# Patient Record
Sex: MALE | Race: WHITE
[De-identification: names, ages, dates, MRNs, and addresses within clinical notes are randomized per-mention and may not be internally consistent; named-entity substitution may affect disease eponyms.]

---

## 2017-06-02 ENCOUNTER — HOSPITAL ENCOUNTER (INPATIENT)
Dept: HOSPITAL 12 - ER | Age: 67
LOS: 6 days | Discharge: NURSING, REHAB OR LONG-TERM CARE | DRG: 280 | End: 2017-06-08
Payer: MEDICARE

## 2017-06-02 VITALS — DIASTOLIC BLOOD PRESSURE: 84 MMHG | SYSTOLIC BLOOD PRESSURE: 131 MMHG

## 2017-06-02 VITALS — SYSTOLIC BLOOD PRESSURE: 123 MMHG | DIASTOLIC BLOOD PRESSURE: 82 MMHG

## 2017-06-02 VITALS — DIASTOLIC BLOOD PRESSURE: 72 MMHG | SYSTOLIC BLOOD PRESSURE: 113 MMHG

## 2017-06-02 VITALS — HEIGHT: 73.5 IN | BODY MASS INDEX: 21.4 KG/M2 | WEIGHT: 165 LBS

## 2017-06-02 VITALS — DIASTOLIC BLOOD PRESSURE: 71 MMHG | SYSTOLIC BLOOD PRESSURE: 123 MMHG

## 2017-06-02 VITALS — DIASTOLIC BLOOD PRESSURE: 82 MMHG | SYSTOLIC BLOOD PRESSURE: 126 MMHG

## 2017-06-02 DIAGNOSIS — I21.4: Primary | ICD-10-CM

## 2017-06-02 DIAGNOSIS — Z79.899: ICD-10-CM

## 2017-06-02 DIAGNOSIS — I10: ICD-10-CM

## 2017-06-02 DIAGNOSIS — Z87.440: ICD-10-CM

## 2017-06-02 DIAGNOSIS — I08.2: ICD-10-CM

## 2017-06-02 DIAGNOSIS — Z59.0: ICD-10-CM

## 2017-06-02 DIAGNOSIS — Z85.46: ICD-10-CM

## 2017-06-02 DIAGNOSIS — I48.2: ICD-10-CM

## 2017-06-02 DIAGNOSIS — I25.10: ICD-10-CM

## 2017-06-02 DIAGNOSIS — R31.9: ICD-10-CM

## 2017-06-02 DIAGNOSIS — I48.91: ICD-10-CM

## 2017-06-02 DIAGNOSIS — N17.0: ICD-10-CM

## 2017-06-02 DIAGNOSIS — Z90.79: ICD-10-CM

## 2017-06-02 LAB
ALP SERPL-CCNC: 40 U/L (ref 50–136)
ALT SERPL W/O P-5'-P-CCNC: 15 U/L (ref 16–63)
AST SERPL-CCNC: 20 U/L (ref 15–37)
BASOPHILS # BLD AUTO: 0.1 K/UL (ref 0–8)
BASOPHILS NFR BLD AUTO: 0.8 % (ref 0–2)
BILIRUB DIRECT SERPL-MCNC: 0.1 MG/DL (ref 0–0.2)
BILIRUB SERPL-MCNC: 0.6 MG/DL (ref 0.2–1)
BUN SERPL-MCNC: 9 MG/DL (ref 7–18)
CHLORIDE SERPL-SCNC: 108 MMOL/L (ref 98–107)
CO2 SERPL-SCNC: 28 MMOL/L (ref 21–32)
CREAT SERPL-MCNC: 1.2 MG/DL (ref 0.6–1.3)
EOSINOPHIL # BLD AUTO: 0.2 K/UL (ref 0–0.7)
EOSINOPHIL NFR BLD AUTO: 1.7 % (ref 0–7)
GLUCOSE SERPL-MCNC: 93 MG/DL (ref 74–106)
HCT VFR BLD AUTO: 45 % (ref 40–50)
HGB BLD-MCNC: 15.2 G/DL (ref 14–18)
LYMPHOCYTES # BLD AUTO: 1.9 K/UL (ref 0.8–4.8)
LYMPHOCYTES NFR BLD AUTO: 20.5 % (ref 20.5–51.5)
MCH RBC QN AUTO: 30.2 UUG (ref 27–31)
MCHC RBC AUTO-ENTMCNC: 34 G/DL (ref 32–37)
MCV RBC AUTO: 89.5 FL (ref 82–92)
MONOCYTES # BLD AUTO: 1 K/UL (ref 0.1–1.3)
MONOCYTES NFR BLD AUTO: 11.2 % (ref 0–11)
NEUTROPHILS # BLD AUTO: 5.9 K/UL (ref 1.8–8.9)
NEUTROPHILS NFR BLD AUTO: 65.8 % (ref 38.5–71.5)
PLATELET # BLD AUTO: 251 K/UL (ref 150–450)
POTASSIUM SERPL-SCNC: 4.1 MMOL/L (ref 3.5–5.1)
RBC # BLD AUTO: 5.02 MIL/UL (ref 4.7–6.1)
WBC # BLD AUTO: 9.1 K/UL (ref 4–11.2)
WS STN SPEC: 7.1 G/DL (ref 6.4–8.2)

## 2017-06-02 PROCEDURE — C1758 CATHETER, URETERAL: HCPCS

## 2017-06-02 PROCEDURE — A4663 DIALYSIS BLOOD PRESSURE CUFF: HCPCS

## 2017-06-02 RX ADMIN — ENOXAPARIN SODIUM SCH MG: 40 INJECTION SUBCUTANEOUS at 21:25

## 2017-06-02 RX ADMIN — DILTIAZEM HYDROCHLORIDE SCH MG: 120 CAPSULE, EXTENDED RELEASE ORAL at 21:24

## 2017-06-02 SDOH — ECONOMIC STABILITY - HOUSING INSECURITY: HOMELESSNESS: Z59.0

## 2017-06-02 NOTE — NUR
PT HAS A H/O PROSTATE CA, CURRENTLY STABLE WITHOUT TREATMENT. ENLARGED 
PROSTATE, S/P TURP, HOWEVER PT HAS DIFFICULTY VOIDING AND OFTEN WILL 
SELF-CATHETERIZE. PT VOIDED 40 ML DESPITE MULTIPLE ATTEMPTS AT VOIDING. PT WAS 
PROVIDED WITH A STRAIGHT CATH KIT AND AMBULATED TO BATHROOM FOR SELF-CATH.

## 2017-06-02 NOTE — NUR
Received pt from ER with Diltiazem drip attached to pt but not infusing. Dilt. drip put on 
pump at 5mg/hr for 30 minutes as pt afib now controlled and HR is 58 -69.( drip stopped). Pt 
medicated with po dilt as per order. Pt is in no acute distress. V/S stable. Assessment and 
admission process done and charted. Pt has Hx of prostate CA causing urinary retention. Pt 
does self cath. Skin wnl no issues.

## 2017-06-02 NOTE — NUR
DR. GARDNER INFORMED THAT PT HAS BEEN OFF DRIP SINCE 1800 AND RATE IS STILL < 
90. INSTRUCTED TO CONTINUE TO OBSERVE OFF DRIP AND POSSIBLY DOWNGRADE.

## 2017-06-03 VITALS — SYSTOLIC BLOOD PRESSURE: 97 MMHG | DIASTOLIC BLOOD PRESSURE: 61 MMHG

## 2017-06-03 VITALS — SYSTOLIC BLOOD PRESSURE: 99 MMHG | DIASTOLIC BLOOD PRESSURE: 58 MMHG

## 2017-06-03 VITALS — SYSTOLIC BLOOD PRESSURE: 130 MMHG | DIASTOLIC BLOOD PRESSURE: 85 MMHG

## 2017-06-03 VITALS — DIASTOLIC BLOOD PRESSURE: 81 MMHG | SYSTOLIC BLOOD PRESSURE: 119 MMHG

## 2017-06-03 VITALS — SYSTOLIC BLOOD PRESSURE: 114 MMHG | DIASTOLIC BLOOD PRESSURE: 72 MMHG

## 2017-06-03 VITALS — DIASTOLIC BLOOD PRESSURE: 78 MMHG | SYSTOLIC BLOOD PRESSURE: 125 MMHG

## 2017-06-03 VITALS — DIASTOLIC BLOOD PRESSURE: 82 MMHG | SYSTOLIC BLOOD PRESSURE: 122 MMHG

## 2017-06-03 VITALS — DIASTOLIC BLOOD PRESSURE: 78 MMHG | SYSTOLIC BLOOD PRESSURE: 114 MMHG

## 2017-06-03 VITALS — SYSTOLIC BLOOD PRESSURE: 119 MMHG | DIASTOLIC BLOOD PRESSURE: 76 MMHG

## 2017-06-03 VITALS — DIASTOLIC BLOOD PRESSURE: 97 MMHG | SYSTOLIC BLOOD PRESSURE: 129 MMHG

## 2017-06-03 VITALS — DIASTOLIC BLOOD PRESSURE: 73 MMHG | SYSTOLIC BLOOD PRESSURE: 116 MMHG

## 2017-06-03 VITALS — SYSTOLIC BLOOD PRESSURE: 121 MMHG | DIASTOLIC BLOOD PRESSURE: 73 MMHG

## 2017-06-03 LAB
APPEARANCE UR: (no result)
BASOPHILS # BLD AUTO: 0.1 K/UL (ref 0–8)
BASOPHILS NFR BLD AUTO: 0.8 % (ref 0–2)
BILIRUB UR QL STRIP: NEGATIVE
BUN SERPL-MCNC: 12 MG/DL (ref 7–18)
CHLORIDE SERPL-SCNC: 107 MMOL/L (ref 98–107)
CHOLEST SERPL-MCNC: 186 MG/DL (ref ?–200)
CO2 SERPL-SCNC: 29 MMOL/L (ref 21–32)
COLOR UR: YELLOW
CREAT SERPL-MCNC: 1.2 MG/DL (ref 0.6–1.3)
DEPRECATED SQUAMOUS URNS QL MICRO: (no result) /HPF
EOSINOPHIL # BLD AUTO: 0.2 K/UL (ref 0–0.7)
EOSINOPHIL NFR BLD AUTO: 2.3 % (ref 0–7)
GLUCOSE SERPL-MCNC: 85 MG/DL (ref 74–106)
GLUCOSE UR STRIP-MCNC: NEGATIVE MG/DL
HCT VFR BLD AUTO: 46.5 % (ref 40–50)
HDLC SERPL-MCNC: 54 MG/DL (ref 40–60)
HGB BLD-MCNC: 15.5 G/DL (ref 14–18)
HGB UR QL STRIP: (no result)
KETONES UR STRIP-MCNC: (no result) MG/DL
LEUKOCYTE ESTERASE UR QL STRIP: NEGATIVE
LYMPHOCYTES # BLD AUTO: 1.8 K/UL (ref 0.8–4.8)
LYMPHOCYTES NFR BLD AUTO: 25.5 % (ref 20.5–51.5)
MAGNESIUM SERPL-MCNC: 2.2 MG/DL (ref 1.8–2.4)
MCH RBC QN AUTO: 30.3 UUG (ref 27–31)
MCHC RBC AUTO-ENTMCNC: 33 G/DL (ref 32–37)
MCV RBC AUTO: 90.7 FL (ref 82–92)
MONOCYTES # BLD AUTO: 0.7 K/UL (ref 0.1–1.3)
MONOCYTES NFR BLD AUTO: 9.6 % (ref 0–11)
MUCOUS THREADS URNS QL MICRO: (no result) /LPF
NEUTROPHILS # BLD AUTO: 4.3 K/UL (ref 1.8–8.9)
NEUTROPHILS NFR BLD AUTO: 61.8 % (ref 38.5–71.5)
NITRITE UR QL STRIP: NEGATIVE
PH UR STRIP: 7 [PH] (ref 5–8)
PHOSPHATE SERPL-MCNC: 3.8 MG/DL (ref 2.5–4.9)
PLATELET # BLD AUTO: 209 K/UL (ref 150–450)
POTASSIUM SERPL-SCNC: 5.3 MMOL/L (ref 3.5–5.1)
PROT UR QL STRIP: (no result)
RBC # BLD AUTO: 5.13 MIL/UL (ref 4.7–6.1)
RBC #/AREA URNS HPF: (no result) /HPF (ref 0–3)
SP GR UR STRIP: 1.02 (ref 1–1.03)
TRIGL SERPL-MCNC: 72 MG/DL (ref 30–150)
TSH SERPL DL<=0.005 MIU/L-ACNC: 2.58 MIU/ML (ref 0.36–3.74)
UROBILINOGEN UR STRIP-MCNC: 2 E.U./DL
WBC # BLD AUTO: 7.1 K/UL (ref 4–11.2)
WBC #/AREA URNS HPF: (no result) /HPF
WBC #/AREA URNS HPF: (no result) /HPF (ref 0–3)

## 2017-06-03 RX ADMIN — ASPIRIN SCH MG: 81 TABLET, CHEWABLE ORAL at 07:49

## 2017-06-03 RX ADMIN — PANTOPRAZOLE SODIUM SCH MG: 40 TABLET, DELAYED RELEASE ORAL at 06:19

## 2017-06-03 RX ADMIN — ENOXAPARIN SODIUM SCH MG: 40 INJECTION SUBCUTANEOUS at 20:30

## 2017-06-03 RX ADMIN — DILTIAZEM HYDROCHLORIDE SCH MG: 120 CAPSULE, EXTENDED RELEASE ORAL at 07:49

## 2017-06-03 NOTE — NUR
Pt received awake and alert. Cardiac monitor and alarms working properly wnl. Pt stable and 
nad noted.

## 2017-06-03 NOTE — NUR
PATIENT RECEIVED RESTING COMFORTABLY IN BED, NO ACUTE DISTRESS NOTED. COMPLIANT WITH LOVENOX 
IN RIGHT LOWER ABDOMEN. NO C/O PAIN AT THIS TIME. REMAINS ON TELE MONITOR IN 'S. BED 
IN LOW AND LOCKED POSITION, CALL LIGHT WITHIN REACH.

## 2017-06-03 NOTE — NUR
END OF SHIFT NOTE: PATIENT IN NO ACUTE DISTRESS THROUGHOUT SHIFT. DENIED CP AT THIS TIME. 
CONTROLLED A. FIB ON TELEMETRY MONITOR. RESPIRATIONS EVEN AND UNLABORED. H.L INTACT AND 
PATENT. PATIENT INDEPENDENT WITH ADLS. NEEDS MET BY STAFF. CALL LIGHT AT REACH.

## 2017-06-03 NOTE — NUR
YARA Bloom here to see pt. Full report given. New orders received and MD okay to 
transfer pt to telemetry today.

## 2017-06-03 NOTE — NUR
[facility] REPORT RECEIVED FORM TAVON SMITH. PATIENT TRANSFERRED FROM CCU UNIT TO ROOM 210. PATIENT 
ALERT, AWAKE AND ORIENTED X4. NO S/S OF DISTRESS OBSERVED. CONTROLLED A. FIB ON TELEMONITOR. 
RESPIRATIONS EVEN AND UNLABORED. VSS. CALL LIGHT AT REACH.

## 2017-06-03 NOTE — NUR
Pt wheeled up to 2nd floor telemetry rm#[id number] for transfer with cardiac monitor applied. All 
belongings reviewed and taken with the pt. Pt stable and nad noted upon transfer.

## 2017-06-04 VITALS — SYSTOLIC BLOOD PRESSURE: 115 MMHG | DIASTOLIC BLOOD PRESSURE: 84 MMHG

## 2017-06-04 VITALS — SYSTOLIC BLOOD PRESSURE: 120 MMHG | DIASTOLIC BLOOD PRESSURE: 84 MMHG

## 2017-06-04 VITALS — SYSTOLIC BLOOD PRESSURE: 120 MMHG | DIASTOLIC BLOOD PRESSURE: 86 MMHG

## 2017-06-04 VITALS — SYSTOLIC BLOOD PRESSURE: 118 MMHG | DIASTOLIC BLOOD PRESSURE: 63 MMHG

## 2017-06-04 VITALS — SYSTOLIC BLOOD PRESSURE: 117 MMHG | DIASTOLIC BLOOD PRESSURE: 86 MMHG

## 2017-06-04 LAB
BASOPHILS # BLD AUTO: 0 K/UL (ref 0–8)
BASOPHILS NFR BLD AUTO: 0.6 % (ref 0–2)
BUN SERPL-MCNC: 14 MG/DL (ref 7–18)
CHLORIDE SERPL-SCNC: 106 MMOL/L (ref 98–107)
CO2 SERPL-SCNC: 31 MMOL/L (ref 21–32)
CREAT SERPL-MCNC: 1.3 MG/DL (ref 0.6–1.3)
EOSINOPHIL # BLD AUTO: 0.2 K/UL (ref 0–0.7)
EOSINOPHIL NFR BLD AUTO: 3.8 % (ref 0–7)
GLUCOSE SERPL-MCNC: 74 MG/DL (ref 74–106)
HCT VFR BLD AUTO: 47.5 % (ref 40–50)
HGB BLD-MCNC: 15.9 G/DL (ref 14–18)
LYMPHOCYTES # BLD AUTO: 1.4 K/UL (ref 0.8–4.8)
LYMPHOCYTES NFR BLD AUTO: 22.6 % (ref 20.5–51.5)
MAGNESIUM SERPL-MCNC: 2.2 MG/DL (ref 1.8–2.4)
MCH RBC QN AUTO: 30.6 UUG (ref 27–31)
MCHC RBC AUTO-ENTMCNC: 34 G/DL (ref 32–37)
MCV RBC AUTO: 91.4 FL (ref 82–92)
MONOCYTES # BLD AUTO: 0.8 K/UL (ref 0.1–1.3)
MONOCYTES NFR BLD AUTO: 13.2 % (ref 0–11)
NEUTROPHILS # BLD AUTO: 3.6 K/UL (ref 1.8–8.9)
NEUTROPHILS NFR BLD AUTO: 59.8 % (ref 38.5–71.5)
PLATELET # BLD AUTO: 215 K/UL (ref 150–450)
POTASSIUM SERPL-SCNC: 4.8 MMOL/L (ref 3.5–5.1)
RBC # BLD AUTO: 5.19 MIL/UL (ref 4.7–6.1)
WBC # BLD AUTO: 6 K/UL (ref 4–11.2)

## 2017-06-04 RX ADMIN — PANTOPRAZOLE SODIUM SCH MG: 40 TABLET, DELAYED RELEASE ORAL at 06:15

## 2017-06-04 RX ADMIN — ASPIRIN SCH MG: 81 TABLET, CHEWABLE ORAL at 08:08

## 2017-06-04 RX ADMIN — DILTIAZEM HYDROCHLORIDE SCH MG: 120 CAPSULE, EXTENDED RELEASE ORAL at 08:10

## 2017-06-04 RX ADMIN — ENOXAPARIN SODIUM SCH MG: 40 INJECTION SUBCUTANEOUS at 20:06

## 2017-06-04 NOTE — NUR
END OF SHIFT NOTE: PATIENT IN NO ACUTE DISTRESS THROUGHOUT SHIFT. VSS. HR. 80-90s ON 
TELEMETRY MONITOR. DENIED CP. PATIENT WITH GOOD APPETITE. BED REST AND BRP. NEEDS MET BY 
STAFF. IV SITE INTACT AND PATENT.

## 2017-06-04 NOTE — NUR
PT RECEIVED IN BED, AWAKE, ALERT AND COMFORTABLE. ABLE TO MAKE NEEDS KNOWN. NO ACUTE 
DISTRESS. CALL LIGHT WITHIN REACH. WILL CONTINUE TO MONITOR.

## 2017-06-04 NOTE — NUR
PATIENT ON TELE MONITOR SHOWING A-FIB GOING 'S, OBSERVED AWAKE AND GETTING UP TO USE 
RESTROOM, PT EDUCATED ABOUT REMAINING IN BED AT THIS TIME. HEP LOCK IN RIGHT AC, INTACT. NO 
ACUTE DISTRESS NOTED. SAFETY MEASURES MAINTAINED.

## 2017-06-04 NOTE — NUR
PATIENT RECEIVED IN ROOM RESTING ALERT AWAKE IN NO ACUTE DISTRESS. DENIED CP AT THIS TIME. 
CONTROLLED A. FIB ON TELEMETRY MONITOR. CALL LIGHT AT REACH.

## 2017-06-04 NOTE — NUR
PATIENT OBSERVED SLEEPING IN BED AT THIS TIME, NO PHYSICAL DISCOMFORT NOTED. PT ON TELE 
MONITOR, SHOWING 90 'S. WILL CONTINUE TO MONITOR FOR SAFETY.

## 2017-06-04 NOTE — NUR
PATIENT IN BED LAYING DOWN. PT ON TELE MONITOR SHOWING 90'S. NO ACUTE DISTRESS NOTED. SAFETY 
MEASURES MAINTAINED.

## 2017-06-05 VITALS — DIASTOLIC BLOOD PRESSURE: 84 MMHG | SYSTOLIC BLOOD PRESSURE: 110 MMHG

## 2017-06-05 VITALS — DIASTOLIC BLOOD PRESSURE: 78 MMHG | SYSTOLIC BLOOD PRESSURE: 134 MMHG

## 2017-06-05 VITALS — SYSTOLIC BLOOD PRESSURE: 103 MMHG | DIASTOLIC BLOOD PRESSURE: 79 MMHG

## 2017-06-05 VITALS — SYSTOLIC BLOOD PRESSURE: 112 MMHG | DIASTOLIC BLOOD PRESSURE: 82 MMHG

## 2017-06-05 VITALS — SYSTOLIC BLOOD PRESSURE: 120 MMHG | DIASTOLIC BLOOD PRESSURE: 84 MMHG

## 2017-06-05 LAB
BASOPHILS # BLD AUTO: 0.1 K/UL (ref 0–8)
BASOPHILS NFR BLD AUTO: 1.5 % (ref 0–2)
BUN SERPL-MCNC: 14 MG/DL (ref 7–18)
CHLORIDE SERPL-SCNC: 108 MMOL/L (ref 98–107)
CO2 SERPL-SCNC: 30 MMOL/L (ref 21–32)
CREAT SERPL-MCNC: 1.2 MG/DL (ref 0.6–1.3)
EOSINOPHIL # BLD AUTO: 0.2 K/UL (ref 0–0.7)
EOSINOPHIL NFR BLD AUTO: 3 % (ref 0–7)
GLUCOSE SERPL-MCNC: 79 MG/DL (ref 74–106)
HCT VFR BLD AUTO: 47.3 % (ref 40–50)
HGB BLD-MCNC: 15.8 G/DL (ref 14–18)
LYMPHOCYTES # BLD AUTO: 1.3 K/UL (ref 0.8–4.8)
LYMPHOCYTES NFR BLD AUTO: 24.8 % (ref 20.5–51.5)
MCH RBC QN AUTO: 30.3 UUG (ref 27–31)
MCHC RBC AUTO-ENTMCNC: 33 G/DL (ref 32–37)
MCV RBC AUTO: 90.7 FL (ref 82–92)
MONOCYTES # BLD AUTO: 0.7 K/UL (ref 0.1–1.3)
MONOCYTES NFR BLD AUTO: 12.8 % (ref 0–11)
NEUTROPHILS # BLD AUTO: 3.1 K/UL (ref 1.8–8.9)
NEUTROPHILS NFR BLD AUTO: 57.9 % (ref 38.5–71.5)
PLATELET # BLD AUTO: 220 K/UL (ref 150–450)
POTASSIUM SERPL-SCNC: 4.4 MMOL/L (ref 3.5–5.1)
RBC # BLD AUTO: 5.22 MIL/UL (ref 4.7–6.1)
WBC # BLD AUTO: 5.4 K/UL (ref 4–11.2)

## 2017-06-05 RX ADMIN — PANTOPRAZOLE SODIUM SCH MG: 40 TABLET, DELAYED RELEASE ORAL at 06:25

## 2017-06-05 RX ADMIN — ENOXAPARIN SODIUM SCH MG: 40 INJECTION SUBCUTANEOUS at 20:28

## 2017-06-05 RX ADMIN — DILTIAZEM HYDROCHLORIDE SCH MG: 240 CAPSULE, EXTENDED RELEASE ORAL at 17:00

## 2017-06-05 RX ADMIN — DILTIAZEM HYDROCHLORIDE SCH MG: 240 CAPSULE, EXTENDED RELEASE ORAL at 09:00

## 2017-06-05 RX ADMIN — ASPIRIN SCH MG: 81 TABLET, CHEWABLE ORAL at 08:10

## 2017-06-05 NOTE — NUR
END OF SHIFT NOTES. PT ASLEEP INTERMITTENTLY THROUGHOUT SHIFT. V/S STABLE. NO SIGNS OF ACUTE 
DISTRESS. COMPLAINS OF MINIMAL CHEST PAIN/DISCOMFORT, TYLENOL ADMINISTERED. TELE IS A-FIB 92 
RESTING, INCREASE  WITH ACTIVITY. NEEDS ATTENDED. SAFETY MAINTAINED. CALL LIGHT WITHIN 
REACH. CONTINUE WITH PLAN OF CARE.

## 2017-06-05 NOTE — NUR
AWAKE ALERT COOPERATE WELL NO C/O OF PAIN OR  SOB ON FALL PRECAUTION CALL LIGHT WITHIN REACH 
 AND BED ALARM ON

## 2017-06-05 NOTE — NUR
PATIENT ALERT ORIENTED, NO COMPLAIN OF PAIN, NO CHEST PAIN, NO SOB, ATRIAL FIB ON 90, AND 
100'S DURING ACTIVITY. NO S/S OF DISTRESS.

## 2017-06-06 VITALS — SYSTOLIC BLOOD PRESSURE: 126 MMHG | DIASTOLIC BLOOD PRESSURE: 84 MMHG

## 2017-06-06 VITALS — SYSTOLIC BLOOD PRESSURE: 120 MMHG | DIASTOLIC BLOOD PRESSURE: 82 MMHG

## 2017-06-06 VITALS — SYSTOLIC BLOOD PRESSURE: 121 MMHG | DIASTOLIC BLOOD PRESSURE: 77 MMHG

## 2017-06-06 VITALS — DIASTOLIC BLOOD PRESSURE: 74 MMHG | SYSTOLIC BLOOD PRESSURE: 106 MMHG

## 2017-06-06 VITALS — SYSTOLIC BLOOD PRESSURE: 115 MMHG | DIASTOLIC BLOOD PRESSURE: 84 MMHG

## 2017-06-06 LAB
AMPHETAMINES UR QL SCN>1000 NG/ML: NEGATIVE
BARBITURATES UR QL SCN: NEGATIVE
BASOPHILS # BLD AUTO: 0.1 K/UL (ref 0–8)
BASOPHILS NFR BLD AUTO: 0.9 % (ref 0–2)
BUN SERPL-MCNC: 14 MG/DL (ref 7–18)
CHLORIDE SERPL-SCNC: 104 MMOL/L (ref 98–107)
CO2 SERPL-SCNC: 21 MMOL/L (ref 21–32)
COCAINE UR QL SCN: NEGATIVE
CREAT SERPL-MCNC: 1.4 MG/DL (ref 0.6–1.3)
EOSINOPHIL # BLD AUTO: 0.2 K/UL (ref 0–0.7)
EOSINOPHIL NFR BLD AUTO: 3.6 % (ref 0–7)
GLUCOSE SERPL-MCNC: 76 MG/DL (ref 74–106)
HCT VFR BLD AUTO: 49.3 % (ref 40–50)
HGB BLD-MCNC: 16.7 G/DL (ref 14–18)
LYMPHOCYTES # BLD AUTO: 2 K/UL (ref 0.8–4.8)
LYMPHOCYTES NFR BLD AUTO: 32.5 % (ref 20.5–51.5)
MCH RBC QN AUTO: 31.5 UUG (ref 27–31)
MCHC RBC AUTO-ENTMCNC: 34 G/DL (ref 32–37)
MCV RBC AUTO: 92.8 FL (ref 82–92)
MONOCYTES # BLD AUTO: 0.9 K/UL (ref 0.1–1.3)
MONOCYTES NFR BLD AUTO: 14.2 % (ref 0–11)
NEUTROPHILS # BLD AUTO: 2.9 K/UL (ref 1.8–8.9)
NEUTROPHILS NFR BLD AUTO: 48.8 % (ref 38.5–71.5)
OPIATES UR QL SCN: NEGATIVE
PCP UR QL SCN>25 NG/ML: NEGATIVE
PLATELET # BLD AUTO: 217 K/UL (ref 150–450)
POTASSIUM SERPL-SCNC: 4.3 MMOL/L (ref 3.5–5.1)
RBC # BLD AUTO: 5.32 MIL/UL (ref 4.7–6.1)
THC UR QL SCN>50 NG/ML: NEGATIVE
WBC # BLD AUTO: 6.1 K/UL (ref 4–11.2)

## 2017-06-06 RX ADMIN — ASPIRIN SCH MG: 81 TABLET, CHEWABLE ORAL at 08:04

## 2017-06-06 RX ADMIN — PANTOPRAZOLE SODIUM SCH MG: 40 TABLET, DELAYED RELEASE ORAL at 06:06

## 2017-06-06 RX ADMIN — Medication SCH MG: at 15:23

## 2017-06-06 RX ADMIN — Medication SCH MG: at 20:48

## 2017-06-06 RX ADMIN — DILTIAZEM HYDROCHLORIDE SCH MG: 240 CAPSULE, EXTENDED RELEASE ORAL at 08:08

## 2017-06-06 NOTE — NUR
Bedside reporting with [place], RN. Received patient in bed, awake, denies any 
pain/discomforts at this time. HL on RAC intact, no s/s of infiltration. Continue care as 
planned.

## 2017-06-06 NOTE — NUR
Pt is in no acute distress. Discussed plan of care with patient re: fall precaution, notify 
nursing of any chest discomfort, dizzyness, and SOB - pt agreeable with plan of care.  Call 
light is within reach.

## 2017-06-06 NOTE — NUR
Dr sandoval, cardiologist, pt had an episode of elevated HR up to 200 when pt ambulated to 
bathroom and urinating.  Pt stated that he felt the palpitation but denies any SOB or any 
chest pain.  Pt was asymptomatic.  HR goes back right down to 130s.

## 2017-06-07 VITALS — DIASTOLIC BLOOD PRESSURE: 74 MMHG | SYSTOLIC BLOOD PRESSURE: 123 MMHG

## 2017-06-07 VITALS — SYSTOLIC BLOOD PRESSURE: 111 MMHG | DIASTOLIC BLOOD PRESSURE: 74 MMHG

## 2017-06-07 VITALS — SYSTOLIC BLOOD PRESSURE: 116 MMHG | DIASTOLIC BLOOD PRESSURE: 76 MMHG

## 2017-06-07 VITALS — DIASTOLIC BLOOD PRESSURE: 73 MMHG | SYSTOLIC BLOOD PRESSURE: 108 MMHG

## 2017-06-07 VITALS — DIASTOLIC BLOOD PRESSURE: 83 MMHG | SYSTOLIC BLOOD PRESSURE: 114 MMHG

## 2017-06-07 LAB
ALP SERPL-CCNC: 40 U/L (ref 50–136)
ALT SERPL W/O P-5'-P-CCNC: 27 U/L (ref 16–63)
AST SERPL-CCNC: 30 U/L (ref 15–37)
BASOPHILS # BLD AUTO: 0 K/UL (ref 0–8)
BASOPHILS NFR BLD AUTO: 0.8 % (ref 0–2)
BILIRUB SERPL-MCNC: 1.1 MG/DL (ref 0.2–1)
BUN SERPL-MCNC: 15 MG/DL (ref 7–18)
CHLORIDE SERPL-SCNC: 105 MMOL/L (ref 98–107)
CO2 SERPL-SCNC: 29 MMOL/L (ref 21–32)
CREAT SERPL-MCNC: 1.3 MG/DL (ref 0.6–1.3)
EOSINOPHIL # BLD AUTO: 0.2 K/UL (ref 0–0.7)
EOSINOPHIL NFR BLD AUTO: 4 % (ref 0–7)
GLUCOSE SERPL-MCNC: 94 MG/DL (ref 74–106)
HCT VFR BLD AUTO: 48.6 % (ref 40–50)
HGB BLD-MCNC: 16.4 G/DL (ref 14–18)
LYMPHOCYTES # BLD AUTO: 1.5 K/UL (ref 0.8–4.8)
LYMPHOCYTES NFR BLD AUTO: 26.7 % (ref 20.5–51.5)
MAGNESIUM SERPL-MCNC: 2.1 MG/DL (ref 1.8–2.4)
MCH RBC QN AUTO: 30.8 UUG (ref 27–31)
MCHC RBC AUTO-ENTMCNC: 34 G/DL (ref 32–37)
MCV RBC AUTO: 91.6 FL (ref 82–92)
MONOCYTES # BLD AUTO: 0.7 K/UL (ref 0.1–1.3)
MONOCYTES NFR BLD AUTO: 13.1 % (ref 0–11)
NEUTROPHILS # BLD AUTO: 3.1 K/UL (ref 1.8–8.9)
NEUTROPHILS NFR BLD AUTO: 55.4 % (ref 38.5–71.5)
PHOSPHATE SERPL-MCNC: 3.6 MG/DL (ref 2.5–4.9)
PLATELET # BLD AUTO: 216 K/UL (ref 150–450)
POTASSIUM SERPL-SCNC: 4.7 MMOL/L (ref 3.5–5.1)
RBC # BLD AUTO: 5.3 MIL/UL (ref 4.7–6.1)
WBC # BLD AUTO: 5.5 K/UL (ref 4–11.2)
WS STN SPEC: 7.1 G/DL (ref 6.4–8.2)

## 2017-06-07 RX ADMIN — ASPIRIN SCH MG: 81 TABLET, CHEWABLE ORAL at 08:23

## 2017-06-07 RX ADMIN — PANTOPRAZOLE SODIUM SCH MG: 40 TABLET, DELAYED RELEASE ORAL at 05:59

## 2017-06-07 RX ADMIN — DILTIAZEM HYDROCHLORIDE SCH MG: 240 CAPSULE, EXTENDED RELEASE ORAL at 08:23

## 2017-06-07 RX ADMIN — Medication SCH MG: at 08:22

## 2017-06-07 RX ADMIN — Medication SCH MG: at 20:29

## 2017-06-07 NOTE — NUR
pt resting in bed, hr 80-90 on tele monitor. all safety and comfort measures maintained 
throughout out shift, call light in reach

## 2017-06-07 NOTE — NUR
PHYSICAL THERAPY IN TO SEE PT, PT , AMBULATED A SMALL DISTANCE AND STATED FEELING OF 
DIZZINESS. PT HR WENT UP  WHILE AMBULATING. PT BACK IN BED, VS STABLE

## 2017-06-07 NOTE — NUR
PT RECEIVED IN BED, AWAKE AND COMFORTABLE. VITAL SIGNS STABLE. NO SIGNS OF ACUTE DISTRESS. 
NO COMPLAINTS OF PAIN. ABLE TO MAKE NEEDS KNOWN. SAFETY MEASURES IMPLEMENTED. CALL LIGHT 
WITHIN REACH. WILL CONTINUE TO MONITOR.

## 2017-06-07 NOTE — NUR
Slept at long interval. No complaint presented throughout the night. HR goes up to 150 bpm 
once in the BR, asymptomatic. continue to monitor. safety measure and fall precaution 
maintained. Continue care as planned.

## 2017-06-08 VITALS — SYSTOLIC BLOOD PRESSURE: 120 MMHG | DIASTOLIC BLOOD PRESSURE: 89 MMHG

## 2017-06-08 VITALS — DIASTOLIC BLOOD PRESSURE: 73 MMHG | SYSTOLIC BLOOD PRESSURE: 113 MMHG

## 2017-06-08 VITALS — DIASTOLIC BLOOD PRESSURE: 79 MMHG | SYSTOLIC BLOOD PRESSURE: 121 MMHG

## 2017-06-08 VITALS — DIASTOLIC BLOOD PRESSURE: 73 MMHG | SYSTOLIC BLOOD PRESSURE: 106 MMHG

## 2017-06-08 LAB
ALP SERPL-CCNC: 44 U/L (ref 50–136)
ALT SERPL W/O P-5'-P-CCNC: 30 U/L (ref 16–63)
AST SERPL-CCNC: 26 U/L (ref 15–37)
BASOPHILS # BLD AUTO: 0.1 K/UL (ref 0–8)
BASOPHILS NFR BLD AUTO: 0.9 % (ref 0–2)
BILIRUB SERPL-MCNC: 1 MG/DL (ref 0.2–1)
BUN SERPL-MCNC: 17 MG/DL (ref 7–18)
CHLORIDE SERPL-SCNC: 102 MMOL/L (ref 98–107)
CO2 SERPL-SCNC: 25 MMOL/L (ref 21–32)
CREAT SERPL-MCNC: 1.5 MG/DL (ref 0.6–1.3)
EOSINOPHIL # BLD AUTO: 0.3 K/UL (ref 0–0.7)
EOSINOPHIL NFR BLD AUTO: 4.3 % (ref 0–7)
GLUCOSE SERPL-MCNC: 85 MG/DL (ref 74–106)
HCT VFR BLD AUTO: 48.5 % (ref 40–50)
HGB BLD-MCNC: 16.4 G/DL (ref 14–18)
LYMPHOCYTES # BLD AUTO: 1.9 K/UL (ref 0.8–4.8)
LYMPHOCYTES NFR BLD AUTO: 31.9 % (ref 20.5–51.5)
MAGNESIUM SERPL-MCNC: 2 MG/DL (ref 1.8–2.4)
MCH RBC QN AUTO: 31.2 UUG (ref 27–31)
MCHC RBC AUTO-ENTMCNC: 34 G/DL (ref 32–37)
MCV RBC AUTO: 92.2 FL (ref 82–92)
MONOCYTES # BLD AUTO: 0.7 K/UL (ref 0.1–1.3)
MONOCYTES NFR BLD AUTO: 12.3 % (ref 0–11)
NEUTROPHILS # BLD AUTO: 2.9 K/UL (ref 1.8–8.9)
NEUTROPHILS NFR BLD AUTO: 50.6 % (ref 38.5–71.5)
PHOSPHATE SERPL-MCNC: 3.2 MG/DL (ref 2.5–4.9)
PLATELET # BLD AUTO: 226 K/UL (ref 150–450)
POTASSIUM SERPL-SCNC: 3.9 MMOL/L (ref 3.5–5.1)
RBC # BLD AUTO: 5.26 MIL/UL (ref 4.7–6.1)
WBC # BLD AUTO: 5.9 K/UL (ref 4–11.2)
WS STN SPEC: 7.8 G/DL (ref 6.4–8.2)

## 2017-06-08 RX ADMIN — PANTOPRAZOLE SODIUM SCH MG: 40 TABLET, DELAYED RELEASE ORAL at 06:03

## 2017-06-08 RX ADMIN — ASPIRIN SCH MG: 81 TABLET, CHEWABLE ORAL at 08:09

## 2017-06-08 RX ADMIN — Medication SCH MG: at 08:11

## 2017-06-08 RX ADMIN — DILTIAZEM HYDROCHLORIDE SCH MG: 240 CAPSULE, EXTENDED RELEASE ORAL at 08:11

## 2017-06-08 NOTE — NUR
END OF SHIFT NOTES. PT SLEPT WELL THROUGHOUT SHIFT. V/S STABLE. NO SIGNS OF ACUTE DISTRESS. 
NO COMPLAINTS OF PAIN. A-FIB AT 90 ON THE MONITOR. NEEDS ATTENDED. SAFETY MAINTAINED. CALL 
LIGHT WITHIN REACH

## 2017-06-08 NOTE — NUR
The patient will be discharged to [facility] [phone]; [address], [place] [address]] via [facility].  He is aware of his discharge 
and was the one who requested [facility].  Spoke to Ele from [facility] and 
she confirmed that they will admit the patient today.  His RN, Chichi, is aware of his 
discharge plan and will call the facility for the report.

## 2017-06-08 NOTE — NUR
DISCHARGE PROTOCOL FOLLOWED, EDUCATION PROVIDED, PT VERBALIZED UNDERSTANDING. IV REMOVED 
WITH NO REDNESS OR IRRITATION NOTED. ID BAND REMOVED. ALL BELONGINGS ACCOUNTED FOR AND 
LEAVING WITH PT, PT LEAVING VIA GURNEY WITH AMBULANCE STAFF.

## 2018-02-14 ENCOUNTER — HOSPITAL ENCOUNTER (EMERGENCY)
Dept: HOSPITAL 12 - ER | Age: 68
LOS: 1 days | Discharge: HOME | End: 2018-02-15
Payer: MEDICARE

## 2018-02-14 VITALS — WEIGHT: 165 LBS | BODY MASS INDEX: 21.87 KG/M2 | HEIGHT: 73 IN

## 2018-02-14 DIAGNOSIS — N39.0: Primary | ICD-10-CM

## 2018-02-14 DIAGNOSIS — Z79.82: ICD-10-CM

## 2018-02-14 DIAGNOSIS — Z85.46: ICD-10-CM

## 2018-02-14 DIAGNOSIS — Z88.0: ICD-10-CM

## 2018-02-14 DIAGNOSIS — M54.5: ICD-10-CM

## 2018-02-14 PROCEDURE — A4663 DIALYSIS BLOOD PRESSURE CUFF: HCPCS

## 2018-02-15 VITALS — DIASTOLIC BLOOD PRESSURE: 89 MMHG | SYSTOLIC BLOOD PRESSURE: 128 MMHG

## 2018-02-15 LAB
APPEARANCE UR: (no result)
BILIRUB UR QL STRIP: NEGATIVE
COLOR UR: YELLOW
DEPRECATED SQUAMOUS URNS QL MICRO: (no result) /HPF
GLUCOSE UR STRIP-MCNC: NEGATIVE MG/DL
KETONES UR STRIP-MCNC: NEGATIVE MG/DL
LEUKOCYTE ESTERASE UR QL STRIP: (no result)
MUCOUS THREADS URNS QL MICRO: (no result) /LPF
NITRITE UR QL STRIP: NEGATIVE
PH UR STRIP: 5.5 [PH] (ref 5–8)
PROT UR QL STRIP: (no result)
RBC #/AREA URNS HPF: (no result) /HPF (ref 0–3)
SP GR UR STRIP: >=1.03 (ref 1–1.03)
UROBILINOGEN UR STRIP-MCNC: 0.2 E.U./DL
WBC #/AREA URNS HPF: (no result) /HPF
WBC #/AREA URNS HPF: (no result) /HPF (ref 0–3)

## 2020-04-06 NOTE — NUR
Assumed care of patient from Yaneth REGALADO RN. Patient awake, alert, oriented x4. 
cardizem drip ongoing. DISPLAY PLAN FREE TEXT